# Patient Record
(demographics unavailable — no encounter records)

---

## 2024-10-18 NOTE — PHYSICAL EXAM
[de-identified] : Well-appearing 32-year-old female.  Physical examination limited due to telehealth

## 2024-10-18 NOTE — PHYSICAL EXAM
[de-identified] : Well-appearing 32-year-old female.  Physical examination limited due to telehealth

## 2024-10-18 NOTE — ASSESSMENT
[FreeTextEntry1] : I had a lengthy discussion with the patient regarding nutrition, exercise, weight loss medications.   Patient will work on the following: -Meet with nutritionist as needed -Eliminate snacks -Focus on eating 3 well-balanced meals during the daytime with appropriate portion size -Cooking fresh meals rather than take out/processed/ready-made foods -Incorporating exercise; walk 8-10k steps daily -Obtain blood work -Increase Zepbound to 10 mg     Discussed with the pt of the warnings of pregnancy while on Zepbound - instructed pt to avoid planned pregnancy and use of contraception - advised pt to stop immediately if becomes pregnant   Pt verbalized understanding of the above Pt will call office immediately for any side effects. Pt verbalizes understanding and wishes to proceed with medical therapy. Patient educated to call with questions/concerns. All questions answered.   Patient is advised to call after 3rd dose for possible dose escalation and make 2 month follow up appointment.  Patient verbalized understanding.  [address] [place] [address] Tel: [phone] Fax: [phone].

## 2024-10-18 NOTE — HISTORY OF PRESENT ILLNESS
[Home] : at home, [unfilled] , at the time of the visit. [Medical Office: ([facility])___] : at the medical office located in  [Verbal consent obtained from patient] : the patient, [unfilled] [FreeTextEntry1] : Gail is a 32-year-old female on Zepbound here for follow-up regarding medical weight loss management.  Patient reports she completed all 4 doses of Zepbound 7.5 mg 2 weeks ago, tolerating the medication very well, denies any significant side effects including but not limited to nausea, constipation, bloating.   Pt is interested in increasing dosage of medication of Zepbound.   As per patient her medical history includes borderline hypertension which have improved after being on Zepbound and lost weight, mild intermittent acid reflux not requiring antiacid medications, hypothyroidism.  Weight Loss Medication Screening: Patient denies history of anxiety, substance abuse, uncontrolled blood pressure, pancreatitis. Patient denies family or personal history thyroid cancer or MEN 2 syndrome. History of bariatric surgery: none Obesity comorbidities: Hypertension. Comorbidities improved/resolved: n/a Anti-obesity medication tried: none Obesity medication side effects: n/a.  Starting weight at initial consult: 282 pounds Current weight at today's visit: 245 pounds

## 2024-10-18 NOTE — HISTORY OF PRESENT ILLNESS
[Home] : at home, [unfilled] , at the time of the visit. [Medical Office: ([facility])___] : at the medical office located in  [Verbal consent obtained from patient] : the patient, [unfilled] [FreeTextEntry1] : Gail is a 32-year-old female on Zepbound here for follow-up regarding medical weight loss management.  Patient reports she completed all 4 doses of Zepbound 7.5 mg 2 weeks ago, tolerating the medication very well, denies any significant side effects including but not limited to nausea, constipation, bloating.   Pt is interested in increasing dosage of medication of Zepbound.   As per patient her medical history includes borderline hypertension which have improved after being on Zepbound and lost weight, mild intermittent acid reflux not requiring antiacid medications, hypothyroidism.  Weight Loss Medication Screening: Patient denies history of anxiety, substance abuse, uncontrolled blood pressure, pancreatitis. Patient denies family or personal history thyroid cancer or MEN 2 syndrome. History of bariatric surgery: none Obesity comorbidities: Hypertension. Comorbidities improved/resolved: n/a Anti-obesity medication tried: none Obesity medication side effects: n/a.  Starting weight at initial consult: 282 pounds Current weight at today's visit: 245 pounds

## 2024-10-18 NOTE — ASSESSMENT
[FreeTextEntry1] : I had a lengthy discussion with the patient regarding nutrition, exercise, weight loss medications.   Patient will work on the following: -Meet with nutritionist as needed -Eliminate snacks -Focus on eating 3 well-balanced meals during the daytime with appropriate portion size -Cooking fresh meals rather than take out/processed/ready-made foods -Incorporating exercise; walk 8-10k steps daily -Obtain blood work -Increase Zepbound to 10 mg     Discussed with the pt of the warnings of pregnancy while on Zepbound - instructed pt to avoid planned pregnancy and use of contraception - advised pt to stop immediately if becomes pregnant   Pt verbalized understanding of the above Pt will call office immediately for any side effects. Pt verbalizes understanding and wishes to proceed with medical therapy. Patient educated to call with questions/concerns. All questions answered.   Patient is advised to call after 3rd dose for possible dose escalation and make 2 month follow up appointment.  Patient verbalized understanding.  [address] [place] [address] Tel: [phone] Fax: [phone].

## 2024-12-19 NOTE — HISTORY OF PRESENT ILLNESS
[Home] : at home, [unfilled] , at the time of the visit. [Medical Office: ([facility])___] : at the medical office located in  [Verbal consent obtained from patient] : the patient, [unfilled] [FreeTextEntry1] : Gail is a 32-year-old female on Zepbound 10 mg here for follow-up regarding medical weight loss management.  Patient reports she has been tolerating Zepbound 10 mg very well, denies any side effects including but not limited to nausea, constipation, bloating.  Patient reports current weight of 236 pounds (weight from previous follow-up in October 2024 was 245 pounds).  Patient reports she is very pleased with her weight loss progress on Zepbound.  As per patient her PMH includes borderline hypertension, acid reflux, patient reports since she has been losing weight on Zepbound, her blood pressure has normalized and she no longer experiences acid reflux.     Weight Loss Medication Screening: Patient denies history of anxiety, substance abuse, uncontrolled blood pressure, pancreatitis. Patient denies family or personal history thyroid cancer or MEN 2 syndrome. History of bariatric surgery: none Obesity comorbidities: Borderline hypertension Comorbidities improved/resolved: Blood pressure normalized   Starting weight at initial consult: (281 pounds and 5 ounces (BMI 48.29) Current weight: 236 pounds (BMI 40.51)

## 2024-12-19 NOTE — HISTORY OF PRESENT ILLNESS
[Home] : at home, [unfilled] , at the time of the visit. [Medical Office: ([facility])___] : at the medical office located in  [Verbal consent obtained from patient] : the patient, [unfilled] [FreeTextEntry1] : Gail is a 32-year-old female on Zepbound 10 mg here for follow-up regarding medical weight loss management.  Patient reports she has been tolerating Zepbound 10 mg very well, denies any side effects including but not limited to nausea, constipation, bloating.  Patient reports current weight of 236 pounds (weight from previous follow-up in October 2024 was 245 pounds).  Patient reports she is very pleased with her weight loss progress on Zepbound.  As per patient her PMH includes borderline hypertension, acid reflux, patient reports since she has been losing weight on Zepbound, her blood pressure has normalized and she no longer experiences acid reflux.     Weight Loss Medication Screening: Patient denies history of anxiety, substance abuse, uncontrolled blood pressure, pancreatitis. Patient denies family or personal history thyroid cancer or MEN 2 syndrome. History of bariatric surgery: none Obesity comorbidities: Borderline hypertension Comorbidities improved/resolved: Blood pressure normalized   Starting weight at initial consult: (281 pounds and 5 ounces (BMI 48.29) Current weight: 236 pounds (BMI 40.51)

## 2024-12-19 NOTE — ASSESSMENT
[FreeTextEntry1] : I had a lengthy discussion with the patient regarding nutrition, exercise, weight loss medications.    I emphasized the importance of making healthier food choices including fresh fruits and vegetables, lean meats, and protein sources. I recommended front loading calories, incorporating whole grains, and eliminating fast foods. I also discussed the importance of avoiding fried/fatty foods and foods containing high sugar content including juices/shakes/sodas/desserts.   I also encouraged beginning an exercise program and recommended cardiovascular exercises along with strength training to build lean muscle. I made suggestions on different types of exercises to try.   Patient will work on the following: -Meet with nutritionist as needed -Eliminate snacks -Focus on eating 3 well-balanced meals during the daytime with appropriate portion size -Cooking fresh meals rather than take out/processed/ready-made foods -Incorporating exercise; walk 8-10k steps daily -Obtain bloodwork (patient is reminded to complete blood work I ordered)     I have discussed with the patient regarding increasing Zepbound to 12.5 mg considering she has been tolerating Zepbound 10 mg very well with no complaint of side effects, patient reports because she has been tolerating Zepbound 10 mg well and losing weight on it successfully, she prefers to maintain on Zepbound 10 mg for the time being, then assess for possible dose escalation in future follow-ups.  All risks and benefits have been discussed with the patient.   Currently there are no contraindications for the use of Zepbound after reviewing pts medical history and labs including personal or family history of thyroid cancer or MEN2. Possible side effects were discussed with the patient including nausea, reflux, constipation, delayed gastric emptying, or pancreatitis.    Discussed with the patient of the warnings of pregnancy while on zepbound - instructed pt to avoid planned pregnancy and use of contraception - advised pt to stop immediately if becomes pregnant Pt verbalized understanding of the above   Pt verbalizes understanding and wishes to proceed with medical therapy. Continue Zepbound 10 mg based on authorization and tolerance. Patient educated to call with questions/concerns. All questions answered.   Patient is advised to call after 3rd dose for possible dose escalation and make 2 month follow up appointment.  Patient verbalized understanding.  Italo Montoya  [address] [place], [place] Tel: [phone] Fax: [phone]

## 2024-12-19 NOTE — PHYSICAL EXAM
[de-identified] : Well-appearing 32-year-old female with obesity.  Physical examination limited due to telehealth.

## 2024-12-19 NOTE — PHYSICAL EXAM
[de-identified] : Well-appearing 32-year-old female with obesity.  Physical examination limited due to telehealth.

## 2024-12-19 NOTE — ASSESSMENT
[FreeTextEntry1] : I had a lengthy discussion with the patient regarding nutrition, exercise, weight loss medications.    I emphasized the importance of making healthier food choices including fresh fruits and vegetables, lean meats, and protein sources. I recommended front loading calories, incorporating whole grains, and eliminating fast foods. I also discussed the importance of avoiding fried/fatty foods and foods containing high sugar content including juices/shakes/sodas/desserts.   I also encouraged beginning an exercise program and recommended cardiovascular exercises along with strength training to build lean muscle. I made suggestions on different types of exercises to try.   Patient will work on the following: -Meet with nutritionist as needed -Eliminate snacks -Focus on eating 3 well-balanced meals during the daytime with appropriate portion size -Cooking fresh meals rather than take out/processed/ready-made foods -Incorporating exercise; walk 8-10k steps daily -Obtain bloodwork (patient is reminded to complete blood work I ordered)     I have discussed with the patient regarding increasing Zepbound to 12.5 mg considering she has been tolerating Zepbound 10 mg very well with no complaint of side effects, patient reports because she has been tolerating Zepbound 10 mg well and losing weight on it successfully, she prefers to maintain on Zepbound 10 mg for the time being, then assess for possible dose escalation in future follow-ups.  All risks and benefits have been discussed with the patient.   Currently there are no contraindications for the use of Zepbound after reviewing pts medical history and labs including personal or family history of thyroid cancer or MEN2. Possible side effects were discussed with the patient including nausea, reflux, constipation, delayed gastric emptying, or pancreatitis.    Discussed with the patient of the warnings of pregnancy while on zepbound - instructed pt to avoid planned pregnancy and use of contraception - advised pt to stop immediately if becomes pregnant Pt verbalized understanding of the above   Pt verbalizes understanding and wishes to proceed with medical therapy. Continue Zepbound 10 mg based on authorization and tolerance. Patient educated to call with questions/concerns. All questions answered.   Patient is advised to call after 3rd dose for possible dose escalation and make 2 month follow up appointment.  Patient verbalized understanding.  Italo Montoya  [address] [place], [place] Tel: [phone] Fax: [phone]